# Patient Record
Sex: FEMALE | Race: WHITE | NOT HISPANIC OR LATINO | Employment: UNEMPLOYED | URBAN - METROPOLITAN AREA
[De-identification: names, ages, dates, MRNs, and addresses within clinical notes are randomized per-mention and may not be internally consistent; named-entity substitution may affect disease eponyms.]

---

## 2017-05-09 ENCOUNTER — HOSPITAL ENCOUNTER (OUTPATIENT)
Dept: LAB | Facility: HOSPITAL | Age: 22
Setting detail: SPECIMEN
Discharge: HOME | End: 2017-05-09
Attending: INTERNAL MEDICINE | Admitting: INTERNAL MEDICINE

## 2017-05-09 LAB
T4 FREE SERPL-MCNC: 1.14 NG/DL (ref 0.58–1.64)
TSH SERPL-ACNC: 1.18 UIU/ML (ref 0.34–5.6)

## 2017-05-26 ENCOUNTER — HOSPITAL ENCOUNTER (OUTPATIENT)
Dept: FAMILY MEDICINE CLINIC | Facility: CLINIC | Age: 22
Setting detail: SPECIMEN
Discharge: HOME | End: 2017-05-26
Attending: INTERNAL MEDICINE | Admitting: INTERNAL MEDICINE

## 2017-05-26 LAB
C TRACH RRNA SPEC QL PROBE: NORMAL
N GONORRHOEA RRNA SPEC QL PROBE: NORMAL
SPECIMEN SOURCE: NORMAL

## 2017-11-03 ENCOUNTER — HOSPITAL ENCOUNTER (OUTPATIENT)
Dept: FAMILY MEDICINE CLINIC | Facility: CLINIC | Age: 22
Setting detail: SPECIMEN
Discharge: HOME | End: 2017-11-03
Attending: INTERNAL MEDICINE | Admitting: INTERNAL MEDICINE

## 2017-11-03 LAB
C TRACH RRNA SPEC QL PROBE: NORMAL
N GONORRHOEA RRNA SPEC QL PROBE: NORMAL
SPECIMEN SOURCE: NORMAL

## 2017-12-19 ENCOUNTER — HOSPITAL ENCOUNTER (OUTPATIENT)
Dept: LAB | Facility: HOSPITAL | Age: 22
Setting detail: SPECIMEN
Discharge: HOME | End: 2017-12-19
Attending: NURSE PRACTITIONER | Admitting: NURSE PRACTITIONER

## 2018-07-25 ENCOUNTER — HOSPITAL ENCOUNTER (OUTPATIENT)
Dept: LAB | Facility: HOSPITAL | Age: 23
Discharge: HOME | End: 2018-07-25
Attending: FAMILY MEDICINE | Admitting: FAMILY MEDICINE

## 2018-10-15 ENCOUNTER — HOSPITAL ENCOUNTER (OUTPATIENT)
Dept: PHYSICAL THERAPY | Facility: HOSPITAL | Age: 23
Setting detail: RECURRING SERIES
Discharge: HOME | End: 2018-12-31
Attending: ORTHOPAEDIC SURGERY | Admitting: ORTHOPAEDIC SURGERY

## 2018-12-06 ENCOUNTER — HOSPITAL ENCOUNTER (OUTPATIENT)
Dept: LAB | Facility: HOSPITAL | Age: 23
Setting detail: SPECIMEN
Discharge: HOME | End: 2018-12-06
Attending: NURSE PRACTITIONER | Admitting: NURSE PRACTITIONER

## 2018-12-06 LAB
T4 FREE SERPL-MCNC: 1.04 NG/DL (ref 0.58–1.64)
TSH SERPL-ACNC: 0.4 UIU/ML (ref 0.34–5.6)

## 2019-01-04 ENCOUNTER — HOSPITAL ENCOUNTER (OUTPATIENT)
Dept: PHYSICAL THERAPY | Facility: HOSPITAL | Age: 24
Setting detail: RECURRING SERIES
Discharge: HOME | End: 2019-02-06
Attending: ORTHOPAEDIC SURGERY | Admitting: ORTHOPAEDIC SURGERY

## 2019-07-23 ENCOUNTER — TELEPHONE (OUTPATIENT)
Dept: ENDOCRINOLOGY | Facility: CLINIC | Age: 24
End: 2019-07-23

## 2019-07-23 RX ORDER — LEVOTHYROXINE SODIUM 200 MCG
200 TABLET ORAL EVERY 24 HOURS
Qty: 90 TABLET | Refills: 3 | Status: SHIPPED | OUTPATIENT
Start: 2019-07-23 | End: 2020-03-20

## 2019-07-23 RX ORDER — LEVOTHYROXINE SODIUM 0.2 MG/1
200 TABLET ORAL EVERY 24 HOURS
COMMUNITY
Start: 2018-05-07 | End: 2019-07-23 | Stop reason: SDUPTHER

## 2019-11-22 DIAGNOSIS — E03.9 HYPOTHYROIDISM, UNSPECIFIED TYPE: ICD-10-CM

## 2019-11-22 DIAGNOSIS — E04.9 GOITER: ICD-10-CM

## 2019-11-22 DIAGNOSIS — E05.00 THYROTOXICOSIS WITH DIFFUSE GOITER AND WITHOUT THYROID STORM: ICD-10-CM

## 2019-11-22 DIAGNOSIS — E89.0 HYPOTHYROIDISM, POSTSURGICAL: Primary | ICD-10-CM

## 2019-11-22 PROBLEM — R30.0 DIFFICULT OR PAINFUL URINATION: Status: ACTIVE | Noted: 2017-04-13

## 2019-11-22 PROBLEM — N89.8 VAGINAL DISCHARGE: Status: ACTIVE | Noted: 2017-11-03

## 2019-11-22 PROBLEM — N39.0 URINARY TRACT INFECTION: Status: ACTIVE | Noted: 2017-04-13

## 2019-11-22 PROBLEM — M25.561 KNEE PAIN, RIGHT: Status: ACTIVE | Noted: 2018-12-06

## 2019-11-22 PROBLEM — IMO0001 TRANSITION OF PATIENT BETWEEN CARE SETTINGS: Status: ACTIVE | Noted: 2018-09-05

## 2019-11-22 PROBLEM — Z78.9 TRANSITION OF PATIENT BETWEEN CARE SETTINGS: Status: ACTIVE | Noted: 2018-09-05

## 2019-11-22 PROBLEM — L50.9 URTICARIA: Status: ACTIVE | Noted: 2018-05-19

## 2019-11-22 PROBLEM — S82.842S: Status: ACTIVE | Noted: 2018-09-05

## 2019-11-22 PROBLEM — L25.9 CONTACT DERMATITIS: Status: ACTIVE | Noted: 2018-05-18

## 2019-12-05 RX ORDER — DROSPIRENONE AND ETHINYL ESTRADIOL 0.02-3(28)
KIT ORAL
COMMUNITY
Start: 2019-05-01 | End: 2020-03-20

## 2019-12-05 RX ORDER — MELOXICAM 15 MG/1
TABLET ORAL EVERY 24 HOURS
COMMUNITY
Start: 2018-08-23 | End: 2020-03-20

## 2019-12-05 RX ORDER — ESCITALOPRAM OXALATE 5 MG/1
TABLET ORAL EVERY 24 HOURS
COMMUNITY
Start: 2018-12-06 | End: 2020-03-20

## 2019-12-12 ENCOUNTER — TELEPHONE (OUTPATIENT)
Dept: ENDOCRINOLOGY | Facility: CLINIC | Age: 24
End: 2019-12-12

## 2020-03-17 ENCOUNTER — LAB (OUTPATIENT)
Dept: LAB | Facility: HOSPITAL | Age: 25
End: 2020-03-17

## 2020-03-17 DIAGNOSIS — E03.9 HYPOTHYROIDISM, UNSPECIFIED TYPE: ICD-10-CM

## 2020-03-17 DIAGNOSIS — E05.00 THYROTOXICOSIS WITH DIFFUSE GOITER AND WITHOUT THYROID STORM: ICD-10-CM

## 2020-03-17 DIAGNOSIS — E04.9 GOITER: ICD-10-CM

## 2020-03-17 DIAGNOSIS — E89.0 HYPOTHYROIDISM, POSTSURGICAL: ICD-10-CM

## 2020-03-17 LAB
T4 FREE SERPL-MCNC: 2.07 NG/DL (ref 0.93–1.7)
TSH SERPL DL<=0.05 MIU/L-ACNC: 0.48 UIU/ML (ref 0.27–4.2)

## 2020-03-17 PROCEDURE — 36415 COLL VENOUS BLD VENIPUNCTURE: CPT

## 2020-03-17 PROCEDURE — 84443 ASSAY THYROID STIM HORMONE: CPT

## 2020-03-17 PROCEDURE — 84439 ASSAY OF FREE THYROXINE: CPT

## 2020-03-20 ENCOUNTER — OFFICE VISIT (OUTPATIENT)
Dept: ENDOCRINOLOGY | Facility: CLINIC | Age: 25
End: 2020-03-20

## 2020-03-20 VITALS
HEART RATE: 84 BPM | DIASTOLIC BLOOD PRESSURE: 70 MMHG | SYSTOLIC BLOOD PRESSURE: 110 MMHG | OXYGEN SATURATION: 94 % | BODY MASS INDEX: 28.99 KG/M2 | HEIGHT: 65 IN | WEIGHT: 174 LBS

## 2020-03-20 DIAGNOSIS — E89.0 HYPOTHYROIDISM, POSTSURGICAL: Primary | ICD-10-CM

## 2020-03-20 PROBLEM — S82.251D: Status: ACTIVE | Noted: 2020-03-20

## 2020-03-20 PROBLEM — S82.391D: Status: ACTIVE | Noted: 2020-03-20

## 2020-03-20 PROCEDURE — 99213 OFFICE O/P EST LOW 20 MIN: CPT | Performed by: INTERNAL MEDICINE

## 2020-03-20 RX ORDER — HYDROCODONE BITARTRATE AND ACETAMINOPHEN 5; 325 MG/1; MG/1
1-2 TABLET ORAL
COMMUNITY
Start: 2013-03-12 | End: 2020-03-20

## 2020-03-20 RX ORDER — ETONOGESTREL AND ETHINYL ESTRADIOL 11.7; 2.7 MG/1; MG/1
1 INSERT, EXTENDED RELEASE VAGINAL
COMMUNITY
End: 2020-03-20

## 2020-03-20 RX ORDER — LEVOTHYROXINE SODIUM 175 MCG
175 TABLET ORAL DAILY
Qty: 90 TABLET | Refills: 3 | Status: SHIPPED | OUTPATIENT
Start: 2020-03-20 | End: 2020-09-17

## 2020-03-20 RX ORDER — IBUPROFEN 200 MG
200 TABLET ORAL
COMMUNITY
End: 2020-03-20

## 2020-03-20 RX ORDER — CEPHALEXIN 500 MG/1
CAPSULE ORAL
COMMUNITY
Start: 2020-02-08 | End: 2020-03-20

## 2020-03-20 RX ORDER — VALACYCLOVIR HYDROCHLORIDE 500 MG/1
TABLET, FILM COATED ORAL
COMMUNITY
Start: 2020-03-18 | End: 2020-03-20

## 2020-03-20 RX ORDER — FLUCONAZOLE 150 MG/1
TABLET ORAL
COMMUNITY
Start: 2020-02-08 | End: 2020-03-20

## 2020-03-20 RX ORDER — VALACYCLOVIR HYDROCHLORIDE 1 G/1
TABLET, FILM COATED ORAL
COMMUNITY
Start: 2020-03-13 | End: 2021-03-11

## 2020-08-03 ENCOUNTER — TELEMEDICINE (OUTPATIENT)
Dept: FAMILY MEDICINE CLINIC | Facility: CLINIC | Age: 25
End: 2020-08-03

## 2020-08-03 DIAGNOSIS — F41.9 ANXIETY: Primary | ICD-10-CM

## 2020-08-03 PROCEDURE — 99213 OFFICE O/P EST LOW 20 MIN: CPT | Performed by: INTERNAL MEDICINE

## 2020-08-03 RX ORDER — ALPRAZOLAM 0.5 MG/1
0.5 TABLET ORAL 2 TIMES DAILY PRN
Qty: 45 TABLET | Refills: 1 | Status: SHIPPED | OUTPATIENT
Start: 2020-08-03

## 2020-08-03 RX ORDER — PAROXETINE HYDROCHLORIDE 20 MG/1
TABLET, FILM COATED ORAL
Qty: 30 TABLET | Refills: 2 | Status: SHIPPED | OUTPATIENT
Start: 2020-08-03 | End: 2020-11-25

## 2020-08-04 ENCOUNTER — LAB (OUTPATIENT)
Dept: LAB | Facility: HOSPITAL | Age: 25
End: 2020-08-04

## 2020-08-04 DIAGNOSIS — E89.0 HYPOTHYROIDISM, POSTSURGICAL: ICD-10-CM

## 2020-08-04 LAB
T4 FREE SERPL-MCNC: 1.47 NG/DL (ref 0.93–1.7)
TSH SERPL DL<=0.05 MIU/L-ACNC: 7.45 UIU/ML (ref 0.27–4.2)

## 2020-08-04 PROCEDURE — 84439 ASSAY OF FREE THYROXINE: CPT

## 2020-08-04 PROCEDURE — 84443 ASSAY THYROID STIM HORMONE: CPT

## 2020-08-04 PROCEDURE — 36415 COLL VENOUS BLD VENIPUNCTURE: CPT

## 2020-08-05 DIAGNOSIS — E03.9 HYPOTHYROIDISM, UNSPECIFIED TYPE: Primary | ICD-10-CM

## 2020-09-15 ENCOUNTER — LAB (OUTPATIENT)
Dept: LAB | Facility: HOSPITAL | Age: 25
End: 2020-09-15

## 2020-09-15 DIAGNOSIS — E03.9 HYPOTHYROIDISM, UNSPECIFIED TYPE: ICD-10-CM

## 2020-09-15 LAB
T4 FREE SERPL-MCNC: 1.33 NG/DL (ref 0.93–1.7)
TSH SERPL DL<=0.05 MIU/L-ACNC: 3.65 UIU/ML (ref 0.27–4.2)

## 2020-09-15 PROCEDURE — 84439 ASSAY OF FREE THYROXINE: CPT

## 2020-09-15 PROCEDURE — 84443 ASSAY THYROID STIM HORMONE: CPT

## 2020-09-15 PROCEDURE — 36415 COLL VENOUS BLD VENIPUNCTURE: CPT

## 2020-09-17 DIAGNOSIS — E04.9 GOITER: ICD-10-CM

## 2020-09-17 DIAGNOSIS — E03.9 HYPOTHYROIDISM, UNSPECIFIED TYPE: Primary | ICD-10-CM

## 2020-09-17 DIAGNOSIS — E05.90 HYPERTHYROIDISM: ICD-10-CM

## 2020-09-17 DIAGNOSIS — E05.00 THYROTOXICOSIS WITH DIFFUSE GOITER AND WITHOUT THYROID STORM: ICD-10-CM

## 2020-09-17 DIAGNOSIS — E05.00 GRAVES DISEASE: ICD-10-CM

## 2020-09-17 DIAGNOSIS — E89.0 HYPOTHYROIDISM, POSTSURGICAL: ICD-10-CM

## 2020-09-17 RX ORDER — LEVOTHYROXINE SODIUM 200 MCG
200 TABLET ORAL DAILY
Qty: 90 TABLET | Refills: 3 | Status: SHIPPED | OUTPATIENT
Start: 2020-09-17 | End: 2021-03-11

## 2020-11-12 ENCOUNTER — TELEPHONE (OUTPATIENT)
Dept: FAMILY MEDICINE CLINIC | Facility: CLINIC | Age: 25
End: 2020-11-12

## 2020-11-25 RX ORDER — PAROXETINE HYDROCHLORIDE 20 MG/1
TABLET, FILM COATED ORAL
Qty: 30 TABLET | Refills: 1 | Status: SHIPPED | OUTPATIENT
Start: 2020-11-25 | End: 2021-03-11

## 2020-12-14 ENCOUNTER — TELEPHONE (OUTPATIENT)
Dept: ENDOCRINOLOGY | Facility: CLINIC | Age: 25
End: 2020-12-14

## 2021-03-04 ENCOUNTER — LAB (OUTPATIENT)
Dept: LAB | Facility: HOSPITAL | Age: 26
End: 2021-03-04

## 2021-03-04 DIAGNOSIS — E03.9 HYPOTHYROIDISM, UNSPECIFIED TYPE: ICD-10-CM

## 2021-03-04 DIAGNOSIS — E05.00 GRAVES DISEASE: ICD-10-CM

## 2021-03-04 DIAGNOSIS — E89.0 HYPOTHYROIDISM, POSTSURGICAL: ICD-10-CM

## 2021-03-04 DIAGNOSIS — E05.90 HYPERTHYROIDISM: ICD-10-CM

## 2021-03-04 DIAGNOSIS — E05.00 THYROTOXICOSIS WITH DIFFUSE GOITER AND WITHOUT THYROID STORM: ICD-10-CM

## 2021-03-04 DIAGNOSIS — E04.9 GOITER: ICD-10-CM

## 2021-03-04 LAB
T4 FREE SERPL-MCNC: 2.47 NG/DL (ref 0.93–1.7)
TSH SERPL DL<=0.05 MIU/L-ACNC: 0.05 UIU/ML (ref 0.27–4.2)

## 2021-03-04 PROCEDURE — 84443 ASSAY THYROID STIM HORMONE: CPT

## 2021-03-04 PROCEDURE — 36415 COLL VENOUS BLD VENIPUNCTURE: CPT

## 2021-03-04 PROCEDURE — 84439 ASSAY OF FREE THYROXINE: CPT

## 2021-03-11 ENCOUNTER — OFFICE VISIT (OUTPATIENT)
Dept: ENDOCRINOLOGY | Facility: CLINIC | Age: 26
End: 2021-03-11

## 2021-03-11 VITALS
BODY MASS INDEX: 34.32 KG/M2 | HEIGHT: 65 IN | DIASTOLIC BLOOD PRESSURE: 70 MMHG | HEART RATE: 68 BPM | TEMPERATURE: 97.6 F | WEIGHT: 206 LBS | OXYGEN SATURATION: 95 % | SYSTOLIC BLOOD PRESSURE: 105 MMHG

## 2021-03-11 DIAGNOSIS — E89.0 HYPOTHYROIDISM, POSTSURGICAL: Primary | ICD-10-CM

## 2021-03-11 PROCEDURE — 99214 OFFICE O/P EST MOD 30 MIN: CPT | Performed by: INTERNAL MEDICINE

## 2021-03-11 RX ORDER — DEXTROMETHORPHAN HYDROBROMIDE AND PROMETHAZINE HYDROCHLORIDE 15; 6.25 MG/5ML; MG/5ML
SYRUP ORAL
COMMUNITY
Start: 2021-02-28 | End: 2021-03-29

## 2021-03-11 RX ORDER — AZITHROMYCIN 250 MG/1
TABLET, FILM COATED ORAL
COMMUNITY
Start: 2021-02-28 | End: 2021-03-11

## 2021-03-11 RX ORDER — BENZONATATE 200 MG/1
CAPSULE ORAL
COMMUNITY
Start: 2021-03-07 | End: 2021-03-29

## 2021-03-11 RX ORDER — ALBUTEROL SULFATE 90 UG/1
AEROSOL, METERED RESPIRATORY (INHALATION)
COMMUNITY
Start: 2021-02-28 | End: 2021-03-29

## 2021-03-11 RX ORDER — PREDNISONE 10 MG/1
TABLET ORAL
COMMUNITY
Start: 2021-03-07 | End: 2021-03-29

## 2021-03-11 RX ORDER — COVID-19 MOLECULAR TEST ASSAY
KIT MISCELLANEOUS
COMMUNITY
Start: 2021-02-23 | End: 2021-03-29

## 2021-03-11 RX ORDER — LEVOTHYROXINE SODIUM 175 MCG
175 TABLET ORAL DAILY
Qty: 30 TABLET | Refills: 11 | Status: SHIPPED | OUTPATIENT
Start: 2021-03-11

## 2021-03-12 ENCOUNTER — TELEPHONE (OUTPATIENT)
Dept: FAMILY MEDICINE CLINIC | Facility: CLINIC | Age: 26
End: 2021-03-12

## 2021-03-29 ENCOUNTER — LAB (OUTPATIENT)
Dept: FAMILY MEDICINE CLINIC | Facility: CLINIC | Age: 26
End: 2021-03-29

## 2021-03-29 ENCOUNTER — OFFICE VISIT (OUTPATIENT)
Dept: FAMILY MEDICINE CLINIC | Facility: CLINIC | Age: 26
End: 2021-03-29

## 2021-03-29 VITALS
BODY MASS INDEX: 34.66 KG/M2 | DIASTOLIC BLOOD PRESSURE: 72 MMHG | SYSTOLIC BLOOD PRESSURE: 120 MMHG | WEIGHT: 208 LBS | HEART RATE: 60 BPM | RESPIRATION RATE: 16 BRPM | OXYGEN SATURATION: 98 % | TEMPERATURE: 96.9 F | HEIGHT: 65 IN

## 2021-03-29 DIAGNOSIS — Z00.00 PREVENTATIVE HEALTH CARE: Primary | ICD-10-CM

## 2021-03-29 DIAGNOSIS — K59.00 CONSTIPATION, UNSPECIFIED CONSTIPATION TYPE: ICD-10-CM

## 2021-03-29 DIAGNOSIS — E03.9 HYPOTHYROIDISM, UNSPECIFIED TYPE: ICD-10-CM

## 2021-03-29 DIAGNOSIS — F32.1 CURRENT MODERATE EPISODE OF MAJOR DEPRESSIVE DISORDER, UNSPECIFIED WHETHER RECURRENT (HCC): ICD-10-CM

## 2021-03-29 LAB
ALBUMIN SERPL-MCNC: 4.4 G/DL (ref 3.5–5.2)
ALBUMIN/GLOB SERPL: 1.7 G/DL
ALP SERPL-CCNC: 61 U/L (ref 39–117)
ALT SERPL W P-5'-P-CCNC: 21 U/L (ref 1–33)
ANION GAP SERPL CALCULATED.3IONS-SCNC: 7.2 MMOL/L (ref 5–15)
AST SERPL-CCNC: 20 U/L (ref 1–32)
BASOPHILS # BLD AUTO: 0.03 10*3/MM3 (ref 0–0.2)
BASOPHILS NFR BLD AUTO: 0.5 % (ref 0–1.5)
BILIRUB SERPL-MCNC: 0.3 MG/DL (ref 0–1.2)
BUN SERPL-MCNC: 10 MG/DL (ref 6–20)
BUN/CREAT SERPL: 15.6 (ref 7–25)
CALCIUM SPEC-SCNC: 9.3 MG/DL (ref 8.6–10.5)
CHLORIDE SERPL-SCNC: 104 MMOL/L (ref 98–107)
CO2 SERPL-SCNC: 25.8 MMOL/L (ref 22–29)
CREAT SERPL-MCNC: 0.64 MG/DL (ref 0.57–1)
DEPRECATED RDW RBC AUTO: 39.8 FL (ref 37–54)
EOSINOPHIL # BLD AUTO: 0.21 10*3/MM3 (ref 0–0.4)
EOSINOPHIL NFR BLD AUTO: 3.6 % (ref 0.3–6.2)
ERYTHROCYTE [DISTWIDTH] IN BLOOD BY AUTOMATED COUNT: 12.7 % (ref 12.3–15.4)
GFR SERPL CREATININE-BSD FRML MDRD: 112 ML/MIN/1.73
GLOBULIN UR ELPH-MCNC: 2.6 GM/DL
GLUCOSE SERPL-MCNC: 103 MG/DL (ref 65–99)
HCT VFR BLD AUTO: 44.2 % (ref 34–46.6)
HCV AB SER DONR QL: NORMAL
HGB BLD-MCNC: 15.1 G/DL (ref 12–15.9)
IMM GRANULOCYTES # BLD AUTO: 0.01 10*3/MM3 (ref 0–0.05)
IMM GRANULOCYTES NFR BLD AUTO: 0.2 % (ref 0–0.5)
LYMPHOCYTES # BLD AUTO: 1.75 10*3/MM3 (ref 0.7–3.1)
LYMPHOCYTES NFR BLD AUTO: 30 % (ref 19.6–45.3)
MCH RBC QN AUTO: 29.5 PG (ref 26.6–33)
MCHC RBC AUTO-ENTMCNC: 34.2 G/DL (ref 31.5–35.7)
MCV RBC AUTO: 86.5 FL (ref 79–97)
MONOCYTES # BLD AUTO: 0.52 10*3/MM3 (ref 0.1–0.9)
MONOCYTES NFR BLD AUTO: 8.9 % (ref 5–12)
NEUTROPHILS NFR BLD AUTO: 3.32 10*3/MM3 (ref 1.7–7)
NEUTROPHILS NFR BLD AUTO: 56.8 % (ref 42.7–76)
NRBC BLD AUTO-RTO: 0 /100 WBC (ref 0–0.2)
PLATELET # BLD AUTO: 268 10*3/MM3 (ref 140–450)
PMV BLD AUTO: 11.6 FL (ref 6–12)
POTASSIUM SERPL-SCNC: 4.3 MMOL/L (ref 3.5–5.2)
PROT SERPL-MCNC: 7 G/DL (ref 6–8.5)
RBC # BLD AUTO: 5.11 10*6/MM3 (ref 3.77–5.28)
SODIUM SERPL-SCNC: 137 MMOL/L (ref 136–145)
WBC # BLD AUTO: 5.84 10*3/MM3 (ref 3.4–10.8)

## 2021-03-29 PROCEDURE — 86803 HEPATITIS C AB TEST: CPT | Performed by: NURSE PRACTITIONER

## 2021-03-29 PROCEDURE — 85025 COMPLETE CBC W/AUTO DIFF WBC: CPT | Performed by: NURSE PRACTITIONER

## 2021-03-29 PROCEDURE — 99395 PREV VISIT EST AGE 18-39: CPT | Performed by: NURSE PRACTITIONER

## 2021-03-29 PROCEDURE — 80053 COMPREHEN METABOLIC PANEL: CPT | Performed by: NURSE PRACTITIONER

## 2021-04-27 ENCOUNTER — LAB (OUTPATIENT)
Dept: LAB | Facility: HOSPITAL | Age: 26
End: 2021-04-27

## 2021-04-27 DIAGNOSIS — E89.0 HYPOTHYROIDISM, POSTSURGICAL: ICD-10-CM

## 2021-04-27 LAB
T4 FREE SERPL-MCNC: 1.82 NG/DL (ref 0.93–1.7)
TSH SERPL DL<=0.05 MIU/L-ACNC: 0.27 UIU/ML (ref 0.27–4.2)

## 2021-04-27 PROCEDURE — 84439 ASSAY OF FREE THYROXINE: CPT

## 2021-04-27 PROCEDURE — 36415 COLL VENOUS BLD VENIPUNCTURE: CPT

## 2021-04-27 PROCEDURE — 84443 ASSAY THYROID STIM HORMONE: CPT

## 2021-08-06 ENCOUNTER — TRANSCRIBE ORDERS (OUTPATIENT)
Dept: ADMINISTRATIVE | Facility: HOSPITAL | Age: 26
End: 2021-08-06

## 2021-08-06 ENCOUNTER — LAB (OUTPATIENT)
Dept: LAB | Facility: HOSPITAL | Age: 26
End: 2021-08-06

## 2021-08-06 DIAGNOSIS — F32.1 MAJOR DEPRESSIVE DISORDER, SINGLE EPISODE, MODERATE (HCC): Primary | ICD-10-CM

## 2021-08-06 DIAGNOSIS — F41.1 GENERALIZED ANXIETY DISORDER: ICD-10-CM

## 2021-08-06 DIAGNOSIS — Z51.81 ENCOUNTER FOR THERAPEUTIC DRUG MONITORING: ICD-10-CM

## 2021-08-06 DIAGNOSIS — F32.1 MAJOR DEPRESSIVE DISORDER, SINGLE EPISODE, MODERATE (HCC): ICD-10-CM

## 2021-08-06 PROCEDURE — 82306 VITAMIN D 25 HYDROXY: CPT

## 2021-08-06 PROCEDURE — 85025 COMPLETE CBC W/AUTO DIFF WBC: CPT

## 2021-08-06 PROCEDURE — 84443 ASSAY THYROID STIM HORMONE: CPT

## 2021-08-06 PROCEDURE — 80061 LIPID PANEL: CPT

## 2021-08-06 PROCEDURE — 83036 HEMOGLOBIN GLYCOSYLATED A1C: CPT

## 2021-08-06 PROCEDURE — 80053 COMPREHEN METABOLIC PANEL: CPT

## 2021-08-06 PROCEDURE — 82746 ASSAY OF FOLIC ACID SERUM: CPT

## 2021-08-06 PROCEDURE — 82607 VITAMIN B-12: CPT

## 2021-08-06 PROCEDURE — 36415 COLL VENOUS BLD VENIPUNCTURE: CPT

## 2021-08-06 PROCEDURE — 84439 ASSAY OF FREE THYROXINE: CPT

## 2021-08-07 LAB
25(OH)D3 SERPL-MCNC: 32.5 NG/ML (ref 30–100)
ALBUMIN SERPL-MCNC: 4.5 G/DL (ref 3.5–5.2)
ALBUMIN/GLOB SERPL: 1.5 G/DL
ALP SERPL-CCNC: 77 U/L (ref 39–117)
ALT SERPL W P-5'-P-CCNC: 25 U/L (ref 1–33)
ANION GAP SERPL CALCULATED.3IONS-SCNC: 9.3 MMOL/L (ref 5–15)
AST SERPL-CCNC: 25 U/L (ref 1–32)
BASOPHILS # BLD AUTO: 0.05 10*3/MM3 (ref 0–0.2)
BASOPHILS NFR BLD AUTO: 0.5 % (ref 0–1.5)
BILIRUB SERPL-MCNC: 0.3 MG/DL (ref 0–1.2)
BUN SERPL-MCNC: 11 MG/DL (ref 6–20)
BUN/CREAT SERPL: 15.3 (ref 7–25)
CALCIUM SPEC-SCNC: 9.5 MG/DL (ref 8.6–10.5)
CHLORIDE SERPL-SCNC: 101 MMOL/L (ref 98–107)
CHOLEST SERPL-MCNC: 187 MG/DL (ref 0–200)
CO2 SERPL-SCNC: 27.7 MMOL/L (ref 22–29)
CREAT SERPL-MCNC: 0.72 MG/DL (ref 0.57–1)
DEPRECATED RDW RBC AUTO: 42.5 FL (ref 37–54)
EOSINOPHIL # BLD AUTO: 0.17 10*3/MM3 (ref 0–0.4)
EOSINOPHIL NFR BLD AUTO: 1.8 % (ref 0.3–6.2)
ERYTHROCYTE [DISTWIDTH] IN BLOOD BY AUTOMATED COUNT: 13.3 % (ref 12.3–15.4)
FOLATE SERPL-MCNC: 15.1 NG/ML (ref 4.78–24.2)
GFR SERPL CREATININE-BSD FRML MDRD: 98 ML/MIN/1.73
GLOBULIN UR ELPH-MCNC: 3.1 GM/DL
GLUCOSE SERPL-MCNC: 76 MG/DL (ref 65–99)
HCT VFR BLD AUTO: 46.9 % (ref 34–46.6)
HDLC SERPL-MCNC: 66 MG/DL (ref 40–60)
HGB BLD-MCNC: 15.3 G/DL (ref 12–15.9)
IMM GRANULOCYTES # BLD AUTO: 0.02 10*3/MM3 (ref 0–0.05)
IMM GRANULOCYTES NFR BLD AUTO: 0.2 % (ref 0–0.5)
LDLC SERPL CALC-MCNC: 94 MG/DL (ref 0–100)
LDLC/HDLC SERPL: 1.36 {RATIO}
LYMPHOCYTES # BLD AUTO: 1.99 10*3/MM3 (ref 0.7–3.1)
LYMPHOCYTES NFR BLD AUTO: 21.2 % (ref 19.6–45.3)
MCH RBC QN AUTO: 28.4 PG (ref 26.6–33)
MCHC RBC AUTO-ENTMCNC: 32.6 G/DL (ref 31.5–35.7)
MCV RBC AUTO: 87 FL (ref 79–97)
MONOCYTES # BLD AUTO: 0.59 10*3/MM3 (ref 0.1–0.9)
MONOCYTES NFR BLD AUTO: 6.3 % (ref 5–12)
NEUTROPHILS NFR BLD AUTO: 6.58 10*3/MM3 (ref 1.7–7)
NEUTROPHILS NFR BLD AUTO: 70 % (ref 42.7–76)
NRBC BLD AUTO-RTO: 0 /100 WBC (ref 0–0.2)
PLATELET # BLD AUTO: 343 10*3/MM3 (ref 140–450)
PMV BLD AUTO: 10.9 FL (ref 6–12)
POTASSIUM SERPL-SCNC: 4.4 MMOL/L (ref 3.5–5.2)
PROT SERPL-MCNC: 7.6 G/DL (ref 6–8.5)
RBC # BLD AUTO: 5.39 10*6/MM3 (ref 3.77–5.28)
SODIUM SERPL-SCNC: 138 MMOL/L (ref 136–145)
T4 FREE SERPL-MCNC: 1.71 NG/DL (ref 0.93–1.7)
TRIGL SERPL-MCNC: 155 MG/DL (ref 0–150)
TSH SERPL DL<=0.05 MIU/L-ACNC: 0.53 UIU/ML (ref 0.27–4.2)
VIT B12 BLD-MCNC: 838 PG/ML (ref 211–946)
VLDLC SERPL-MCNC: 27 MG/DL (ref 5–40)
WBC # BLD AUTO: 9.4 10*3/MM3 (ref 3.4–10.8)

## 2021-08-09 LAB — HBA1C MFR BLD: 4.9 % (ref 3.5–5.6)

## 2021-08-16 ENCOUNTER — TELEPHONE (OUTPATIENT)
Dept: FAMILY MEDICINE CLINIC | Facility: CLINIC | Age: 26
End: 2021-08-16

## 2023-08-14 ENCOUNTER — OFFICE VISIT (OUTPATIENT)
Dept: FAMILY MEDICINE CLINIC | Facility: CLINIC | Age: 28
End: 2023-08-14
Payer: COMMERCIAL

## 2023-08-14 VITALS
SYSTOLIC BLOOD PRESSURE: 122 MMHG | BODY MASS INDEX: 36.82 KG/M2 | WEIGHT: 221 LBS | OXYGEN SATURATION: 98 % | HEART RATE: 70 BPM | HEIGHT: 65 IN | DIASTOLIC BLOOD PRESSURE: 80 MMHG

## 2023-08-14 DIAGNOSIS — Z00.00 PREVENTATIVE HEALTH CARE: ICD-10-CM

## 2023-08-14 DIAGNOSIS — R41.840 DIFFICULTY CONCENTRATING: ICD-10-CM

## 2023-08-14 DIAGNOSIS — F32.A ANXIETY AND DEPRESSION: Primary | ICD-10-CM

## 2023-08-14 DIAGNOSIS — R63.5 WEIGHT GAIN: ICD-10-CM

## 2023-08-14 DIAGNOSIS — E03.9 ACQUIRED HYPOTHYROIDISM: ICD-10-CM

## 2023-08-14 DIAGNOSIS — F41.9 ANXIETY AND DEPRESSION: Primary | ICD-10-CM

## 2023-08-14 DIAGNOSIS — L70.9 ACNE, UNSPECIFIED ACNE TYPE: ICD-10-CM

## 2023-08-14 PROCEDURE — 99214 OFFICE O/P EST MOD 30 MIN: CPT | Performed by: NURSE PRACTITIONER

## 2023-08-14 RX ORDER — FLUOXETINE HYDROCHLORIDE 20 MG/1
20 CAPSULE ORAL DAILY
COMMUNITY
Start: 2022-10-09

## 2023-08-14 RX ORDER — LEVOTHYROXINE SODIUM 175 MCG
175 TABLET ORAL DAILY
Qty: 30 TABLET | Refills: 11 | Status: SHIPPED | OUTPATIENT
Start: 2023-08-14

## 2023-09-28 ENCOUNTER — CLINICAL SUPPORT (OUTPATIENT)
Dept: FAMILY MEDICINE CLINIC | Facility: CLINIC | Age: 28
End: 2023-09-28
Payer: COMMERCIAL

## 2023-09-28 DIAGNOSIS — Z00.00 PREVENTATIVE HEALTH CARE: ICD-10-CM

## 2023-09-28 DIAGNOSIS — L70.9 ACNE, UNSPECIFIED ACNE TYPE: ICD-10-CM

## 2023-09-28 DIAGNOSIS — R63.5 WEIGHT GAIN: ICD-10-CM

## 2023-09-28 DIAGNOSIS — E03.9 ACQUIRED HYPOTHYROIDISM: ICD-10-CM

## 2023-09-28 PROCEDURE — 84403 ASSAY OF TOTAL TESTOSTERONE: CPT | Performed by: NURSE PRACTITIONER

## 2023-09-28 PROCEDURE — 83036 HEMOGLOBIN GLYCOSYLATED A1C: CPT | Performed by: NURSE PRACTITIONER

## 2023-09-28 PROCEDURE — 84439 ASSAY OF FREE THYROXINE: CPT | Performed by: NURSE PRACTITIONER

## 2023-09-28 PROCEDURE — 36415 COLL VENOUS BLD VENIPUNCTURE: CPT | Performed by: NURSE PRACTITIONER

## 2023-09-28 PROCEDURE — 80061 LIPID PANEL: CPT | Performed by: NURSE PRACTITIONER

## 2023-09-28 PROCEDURE — 80053 COMPREHEN METABOLIC PANEL: CPT | Performed by: NURSE PRACTITIONER

## 2023-09-28 PROCEDURE — 85027 COMPLETE CBC AUTOMATED: CPT | Performed by: NURSE PRACTITIONER

## 2023-09-28 PROCEDURE — 84443 ASSAY THYROID STIM HORMONE: CPT | Performed by: NURSE PRACTITIONER

## 2023-09-28 PROCEDURE — 84481 FREE ASSAY (FT-3): CPT | Performed by: NURSE PRACTITIONER

## 2023-09-28 PROCEDURE — 84402 ASSAY OF FREE TESTOSTERONE: CPT | Performed by: NURSE PRACTITIONER

## 2023-09-29 LAB
ALBUMIN SERPL-MCNC: 4.6 G/DL (ref 3.5–5.2)
ALBUMIN/GLOB SERPL: 1.6 G/DL
ALP SERPL-CCNC: 58 U/L (ref 39–117)
ALT SERPL W P-5'-P-CCNC: 25 U/L (ref 1–33)
ANION GAP SERPL CALCULATED.3IONS-SCNC: 11.3 MMOL/L (ref 5–15)
AST SERPL-CCNC: 24 U/L (ref 1–32)
BILIRUB SERPL-MCNC: 0.6 MG/DL (ref 0–1.2)
BUN SERPL-MCNC: 15 MG/DL (ref 6–20)
BUN/CREAT SERPL: 21.7 (ref 7–25)
CALCIUM SPEC-SCNC: 9.4 MG/DL (ref 8.6–10.5)
CHLORIDE SERPL-SCNC: 101 MMOL/L (ref 98–107)
CHOLEST SERPL-MCNC: 203 MG/DL (ref 0–200)
CO2 SERPL-SCNC: 23.7 MMOL/L (ref 22–29)
CREAT SERPL-MCNC: 0.69 MG/DL (ref 0.57–1)
DEPRECATED RDW RBC AUTO: 38.7 FL (ref 37–54)
EGFRCR SERPLBLD CKD-EPI 2021: 121.4 ML/MIN/1.73
ERYTHROCYTE [DISTWIDTH] IN BLOOD BY AUTOMATED COUNT: 12 % (ref 12.3–15.4)
GLOBULIN UR ELPH-MCNC: 2.9 GM/DL
GLUCOSE SERPL-MCNC: 123 MG/DL (ref 65–99)
HBA1C MFR BLD: 5.4 % (ref 4.8–5.6)
HCT VFR BLD AUTO: 43.2 % (ref 34–46.6)
HDLC SERPL-MCNC: 54 MG/DL (ref 40–60)
HGB BLD-MCNC: 14.9 G/DL (ref 12–15.9)
LDLC SERPL CALC-MCNC: 139 MG/DL (ref 0–100)
LDLC/HDLC SERPL: 2.55 {RATIO}
MCH RBC QN AUTO: 30.3 PG (ref 26.6–33)
MCHC RBC AUTO-ENTMCNC: 34.5 G/DL (ref 31.5–35.7)
MCV RBC AUTO: 88 FL (ref 79–97)
PLATELET # BLD AUTO: 264 10*3/MM3 (ref 140–450)
PMV BLD AUTO: 11.2 FL (ref 6–12)
POTASSIUM SERPL-SCNC: 4.2 MMOL/L (ref 3.5–5.2)
PROT SERPL-MCNC: 7.5 G/DL (ref 6–8.5)
RBC # BLD AUTO: 4.91 10*6/MM3 (ref 3.77–5.28)
SODIUM SERPL-SCNC: 136 MMOL/L (ref 136–145)
T3FREE SERPL-MCNC: 3.33 PG/ML (ref 2–4.4)
T4 FREE SERPL-MCNC: 1.9 NG/DL (ref 0.93–1.7)
TRIGL SERPL-MCNC: 56 MG/DL (ref 0–150)
TSH SERPL DL<=0.05 MIU/L-ACNC: 1.42 UIU/ML (ref 0.27–4.2)
VLDLC SERPL-MCNC: 10 MG/DL (ref 5–40)
WBC NRBC COR # BLD: 5.34 10*3/MM3 (ref 3.4–10.8)

## 2023-10-07 LAB
TESTOST FREE SERPL-MCNC: 2.8 PG/ML (ref 0–4.2)
TESTOST SERPL-MCNC: 30.9 NG/DL (ref 10–55)

## 2024-05-13 RX ORDER — FLUOXETINE HYDROCHLORIDE 20 MG/1
20 CAPSULE ORAL DAILY
Qty: 30 CAPSULE | Refills: 2 | Status: SHIPPED | OUTPATIENT
Start: 2024-05-13

## 2024-05-20 ENCOUNTER — LAB (OUTPATIENT)
Dept: FAMILY MEDICINE CLINIC | Facility: CLINIC | Age: 29
End: 2024-05-20
Payer: COMMERCIAL

## 2024-05-20 ENCOUNTER — OFFICE VISIT (OUTPATIENT)
Dept: FAMILY MEDICINE CLINIC | Facility: CLINIC | Age: 29
End: 2024-05-20
Payer: COMMERCIAL

## 2024-05-20 VITALS
SYSTOLIC BLOOD PRESSURE: 128 MMHG | DIASTOLIC BLOOD PRESSURE: 70 MMHG | OXYGEN SATURATION: 97 % | WEIGHT: 223 LBS | HEIGHT: 65 IN | HEART RATE: 71 BPM | BODY MASS INDEX: 37.15 KG/M2

## 2024-05-20 DIAGNOSIS — E03.9 HYPOTHYROIDISM, UNSPECIFIED TYPE: Primary | ICD-10-CM

## 2024-05-20 DIAGNOSIS — E66.9 CLASS 2 OBESITY WITHOUT SERIOUS COMORBIDITY WITH BODY MASS INDEX (BMI) OF 37.0 TO 37.9 IN ADULT, UNSPECIFIED OBESITY TYPE: ICD-10-CM

## 2024-05-20 DIAGNOSIS — S82.251D DISPLACED COMMINUTED FRACTURE OF SHAFT OF RIGHT TIBIA, SUBSEQUENT ENCOUNTER FOR CLOSED FRACTURE WITH ROUTINE HEALING: ICD-10-CM

## 2024-05-20 DIAGNOSIS — Z00.00 PREVENTATIVE HEALTH CARE: ICD-10-CM

## 2024-05-20 PROBLEM — E66.812 CLASS 2 OBESITY WITHOUT SERIOUS COMORBIDITY WITH BODY MASS INDEX (BMI) OF 37.0 TO 37.9 IN ADULT: Status: ACTIVE | Noted: 2024-05-20

## 2024-05-20 PROCEDURE — 80053 COMPREHEN METABOLIC PANEL: CPT | Performed by: NURSE PRACTITIONER

## 2024-05-20 PROCEDURE — 85027 COMPLETE CBC AUTOMATED: CPT | Performed by: NURSE PRACTITIONER

## 2024-05-20 PROCEDURE — 84439 ASSAY OF FREE THYROXINE: CPT | Performed by: NURSE PRACTITIONER

## 2024-05-20 PROCEDURE — 99214 OFFICE O/P EST MOD 30 MIN: CPT | Performed by: NURSE PRACTITIONER

## 2024-05-20 PROCEDURE — 84443 ASSAY THYROID STIM HORMONE: CPT | Performed by: NURSE PRACTITIONER

## 2024-05-20 PROCEDURE — 36415 COLL VENOUS BLD VENIPUNCTURE: CPT | Performed by: NURSE PRACTITIONER

## 2024-05-20 PROCEDURE — 84481 FREE ASSAY (FT-3): CPT | Performed by: NURSE PRACTITIONER

## 2024-05-20 PROCEDURE — 83036 HEMOGLOBIN GLYCOSYLATED A1C: CPT | Performed by: NURSE PRACTITIONER

## 2024-05-21 LAB
ALBUMIN SERPL-MCNC: 4.4 G/DL (ref 3.5–5.2)
ALBUMIN/GLOB SERPL: 1.7 G/DL
ALP SERPL-CCNC: 70 U/L (ref 39–117)
ALT SERPL W P-5'-P-CCNC: 21 U/L (ref 1–33)
ANION GAP SERPL CALCULATED.3IONS-SCNC: 10.3 MMOL/L (ref 5–15)
AST SERPL-CCNC: 19 U/L (ref 1–32)
BILIRUB SERPL-MCNC: 0.5 MG/DL (ref 0–1.2)
BUN SERPL-MCNC: 11 MG/DL (ref 6–20)
BUN/CREAT SERPL: 12.9 (ref 7–25)
CALCIUM SPEC-SCNC: 9.3 MG/DL (ref 8.6–10.5)
CHLORIDE SERPL-SCNC: 104 MMOL/L (ref 98–107)
CO2 SERPL-SCNC: 24.7 MMOL/L (ref 22–29)
CREAT SERPL-MCNC: 0.85 MG/DL (ref 0.57–1)
DEPRECATED RDW RBC AUTO: 36.9 FL (ref 37–54)
EGFRCR SERPLBLD CKD-EPI 2021: 95.2 ML/MIN/1.73
ERYTHROCYTE [DISTWIDTH] IN BLOOD BY AUTOMATED COUNT: 12.1 % (ref 12.3–15.4)
GLOBULIN UR ELPH-MCNC: 2.6 GM/DL
GLUCOSE SERPL-MCNC: 85 MG/DL (ref 65–99)
HBA1C MFR BLD: 5.1 % (ref 4.8–5.6)
HCT VFR BLD AUTO: 43.2 % (ref 34–46.6)
HGB BLD-MCNC: 14.7 G/DL (ref 12–15.9)
MCH RBC QN AUTO: 28.9 PG (ref 26.6–33)
MCHC RBC AUTO-ENTMCNC: 34 G/DL (ref 31.5–35.7)
MCV RBC AUTO: 85 FL (ref 79–97)
PLATELET # BLD AUTO: 311 10*3/MM3 (ref 140–450)
PMV BLD AUTO: 11.2 FL (ref 6–12)
POTASSIUM SERPL-SCNC: 4.3 MMOL/L (ref 3.5–5.2)
PROT SERPL-MCNC: 7 G/DL (ref 6–8.5)
RBC # BLD AUTO: 5.08 10*6/MM3 (ref 3.77–5.28)
SODIUM SERPL-SCNC: 139 MMOL/L (ref 136–145)
T3FREE SERPL-MCNC: 3.08 PG/ML (ref 2–4.4)
T4 FREE SERPL-MCNC: 1.57 NG/DL (ref 0.93–1.7)
TSH SERPL DL<=0.05 MIU/L-ACNC: 0.33 UIU/ML (ref 0.27–4.2)
WBC NRBC COR # BLD AUTO: 7.25 10*3/MM3 (ref 3.4–10.8)

## 2024-05-23 ENCOUNTER — TELEPHONE (OUTPATIENT)
Dept: FAMILY MEDICINE CLINIC | Facility: CLINIC | Age: 29
End: 2024-05-23
Payer: COMMERCIAL

## 2024-05-23 DIAGNOSIS — S82.251D DISPLACED COMMINUTED FRACTURE OF SHAFT OF RIGHT TIBIA, SUBSEQUENT ENCOUNTER FOR CLOSED FRACTURE WITH ROUTINE HEALING: Primary | ICD-10-CM

## 2024-08-26 ENCOUNTER — OFFICE VISIT (OUTPATIENT)
Dept: FAMILY MEDICINE CLINIC | Facility: CLINIC | Age: 29
End: 2024-08-26
Payer: COMMERCIAL

## 2024-08-26 VITALS
HEIGHT: 65 IN | WEIGHT: 220 LBS | OXYGEN SATURATION: 99 % | HEART RATE: 77 BPM | DIASTOLIC BLOOD PRESSURE: 70 MMHG | BODY MASS INDEX: 36.65 KG/M2 | SYSTOLIC BLOOD PRESSURE: 124 MMHG

## 2024-08-26 DIAGNOSIS — E66.9 CLASS 2 OBESITY WITHOUT SERIOUS COMORBIDITY WITH BODY MASS INDEX (BMI) OF 36.0 TO 36.9 IN ADULT, UNSPECIFIED OBESITY TYPE: ICD-10-CM

## 2024-08-26 DIAGNOSIS — F32.A ANXIETY AND DEPRESSION: ICD-10-CM

## 2024-08-26 DIAGNOSIS — F41.9 ANXIETY AND DEPRESSION: ICD-10-CM

## 2024-08-26 DIAGNOSIS — E03.9 HYPOTHYROIDISM, UNSPECIFIED TYPE: Primary | ICD-10-CM

## 2024-08-26 PROCEDURE — 99214 OFFICE O/P EST MOD 30 MIN: CPT | Performed by: NURSE PRACTITIONER

## 2024-08-26 RX ORDER — FLUOXETINE 40 MG/1
40 CAPSULE ORAL DAILY
Qty: 90 CAPSULE | Refills: 1 | Status: SHIPPED | OUTPATIENT
Start: 2024-08-26

## 2024-09-16 RX ORDER — LEVOTHYROXINE SODIUM 175 MCG
175 TABLET ORAL DAILY
Qty: 30 TABLET | Refills: 11 | Status: SHIPPED | OUTPATIENT
Start: 2024-09-16

## 2024-11-27 ENCOUNTER — TELEPHONE (OUTPATIENT)
Dept: FAMILY MEDICINE CLINIC | Facility: CLINIC | Age: 29
End: 2024-11-27
Payer: COMMERCIAL

## 2024-12-21 ENCOUNTER — TELEMEDICINE (OUTPATIENT)
Dept: FAMILY MEDICINE CLINIC | Facility: TELEHEALTH | Age: 29
End: 2024-12-21
Payer: COMMERCIAL

## 2024-12-21 DIAGNOSIS — R39.89 SUSPECTED UTI: Primary | ICD-10-CM

## 2024-12-21 RX ORDER — NITROFURANTOIN 25; 75 MG/1; MG/1
100 CAPSULE ORAL 2 TIMES DAILY
Qty: 10 CAPSULE | Refills: 0 | Status: SHIPPED | OUTPATIENT
Start: 2024-12-21 | End: 2024-12-26

## 2024-12-21 RX ORDER — LIOTHYRONINE SODIUM 5 UG/1
TABLET ORAL
COMMUNITY
Start: 2024-10-17

## 2024-12-21 NOTE — PROGRESS NOTES
Subjective   Chief Complaint   Patient presents with    Urinary Tract Infection       Rianna Tanner is a 29 y.o. female.     History of Present Illness  Patient reports urinary frequency, urgency, bladder fullness and change in urine color that started about 1 week ago.  Home UTI test was positive for UTI.  Urinary Tract Infection   This is a new problem. Episode onset: 1 week. The problem has been unchanged. There has been no fever. There is no history of pyelonephritis. Associated symptoms include frequency, hesitancy and urgency. Pertinent negatives include no chills, discharge, flank pain, hematuria, nausea, possible pregnancy, sweats or vomiting. She has tried nothing for the symptoms.        No Known Allergies    Past Medical History:   Diagnosis Date    Anxiety     Depression     Hypothyroidism     Pneumonia        Past Surgical History:   Procedure Laterality Date    FRACTURE SURGERY Right     leg    THYROIDECTOMY         Social History     Socioeconomic History    Marital status: Single   Tobacco Use    Smoking status: Never     Passive exposure: Never    Smokeless tobacco: Never   Vaping Use    Vaping status: Never Used   Substance and Sexual Activity    Alcohol use: Yes     Comment: socially     Drug use: Never    Sexual activity: Defer       Family History   Problem Relation Age of Onset    Arthritis Mother     Lupus Mother     Rheum arthritis Mother     Heart disease Father          Current Outpatient Medications:     liothyronine (CYTOMEL) 5 MCG tablet, take 1 tablet by mouth once daily on an empty stomach with levothyroxine, Disp: , Rfl:     FLUoxetine (PROzac) 40 MG capsule, Take 1 capsule by mouth Daily., Disp: 90 capsule, Rfl: 1    nitrofurantoin, macrocrystal-monohydrate, (Macrobid) 100 MG capsule, Take 1 capsule by mouth 2 (Two) Times a Day for 5 days., Disp: 10 capsule, Rfl: 0    Synthroid 175 MCG tablet, Take 1 tablet by mouth Daily., Disp: 30 tablet, Rfl: 11      Review of Systems    Constitutional:  Negative for chills, diaphoresis, fatigue and fever.   Gastrointestinal:  Negative for abdominal pain, nausea and vomiting.   Genitourinary:  Positive for frequency, hesitancy, pelvic pressure and urgency. Negative for dysuria, flank pain, hematuria, vaginal bleeding, vaginal discharge and vaginal pain.   Musculoskeletal:  Negative for back pain.        There were no vitals filed for this visit.    Objective   Physical Exam  Constitutional:       General: She is not in acute distress.     Appearance: Normal appearance. She is not ill-appearing, toxic-appearing or diaphoretic.   HENT:      Head: Normocephalic.      Mouth/Throat:      Lips: Pink.      Mouth: Mucous membranes are moist.   Pulmonary:      Effort: Pulmonary effort is normal.   Abdominal:      Tenderness: There is no abdominal tenderness (per pt).   Neurological:      Mental Status: She is alert and oriented to person, place, and time.          Procedures     Assessment & Plan   Diagnoses and all orders for this visit:    1. Suspected UTI (Primary)  -     nitrofurantoin, macrocrystal-monohydrate, (Macrobid) 100 MG capsule; Take 1 capsule by mouth 2 (Two) Times a Day for 5 days.  Dispense: 10 capsule; Refill: 0      Wipe front to back, increase water to flush the kidneys and avoid bladder irritants such as caffeine and alcohol.    -Warning signs: severe abdominal/pelvic/back pain, fever >101, blood in urine - seek medical attention as soon as possible for a hands on/objective exam and possible labs.     If symptoms worsen or do not improve follow up with your PCP or visit your nearest Urgent Care Center or ER.      PLAN: Discussed dosing, side effects, recommended other symptomatic care.  Patient should follow up with primary care provider, Urgent Care or ER if symptoms worsen, fail to resolve or other symptoms need attention. Patient/family agree to the above.         AURELIANO Hall     Mode of Visit: Video  Location of patient:  -HOME-  Location of provider: +[facility]+  You have chosen to receive care through a telehealth visit.  The patient has signed the video visit consent form.  The visit included audio and video interaction. No technical issues occurred during this visit.    The use of a video visit has been reviewed with the patient and verbal informed consent has been obtained. Myself and Rianna Tanner participated in this visit. The patient is located at [address] [place] [address] Dr Lozoya [address] [place] [address] [place] [facility]. I am located in [place], [place]. Mychart and Zoom were utilized.        This visit was performed via Telehealth.  This patient has been instructed to follow-up with their primary care provider if their symptoms worsen or the treatment provided does not resolve their illness.

## 2025-01-02 ENCOUNTER — OFFICE VISIT (OUTPATIENT)
Dept: FAMILY MEDICINE CLINIC | Facility: CLINIC | Age: 30
End: 2025-01-02
Payer: COMMERCIAL

## 2025-01-02 VITALS
DIASTOLIC BLOOD PRESSURE: 70 MMHG | HEART RATE: 60 BPM | WEIGHT: 208 LBS | HEIGHT: 65 IN | OXYGEN SATURATION: 98 % | SYSTOLIC BLOOD PRESSURE: 118 MMHG | BODY MASS INDEX: 34.66 KG/M2

## 2025-01-02 DIAGNOSIS — R07.81 RIB PAIN ON LEFT SIDE: ICD-10-CM

## 2025-01-02 DIAGNOSIS — R39.89 SUSPECTED UTI: ICD-10-CM

## 2025-01-02 DIAGNOSIS — Z30.011 OCP (ORAL CONTRACEPTIVE PILLS) INITIATION: Primary | ICD-10-CM

## 2025-01-02 DIAGNOSIS — Z32.01 POSITIVE PREGNANCY TEST: ICD-10-CM

## 2025-01-02 LAB
B-HCG UR QL: POSITIVE
EXPIRATION DATE: ABNORMAL
INTERNAL NEGATIVE CONTROL: ABNORMAL
INTERNAL POSITIVE CONTROL: ABNORMAL
Lab: ABNORMAL

## 2025-01-02 PROCEDURE — 81025 URINE PREGNANCY TEST: CPT | Performed by: NURSE PRACTITIONER

## 2025-01-02 PROCEDURE — 99214 OFFICE O/P EST MOD 30 MIN: CPT | Performed by: NURSE PRACTITIONER

## 2025-01-02 PROCEDURE — 84703 CHORIONIC GONADOTROPIN ASSAY: CPT | Performed by: NURSE PRACTITIONER

## 2025-01-02 PROCEDURE — 36415 COLL VENOUS BLD VENIPUNCTURE: CPT | Performed by: NURSE PRACTITIONER

## 2025-01-02 RX ORDER — AMOXICILLIN 500 MG/1
500 CAPSULE ORAL 2 TIMES DAILY
Qty: 20 CAPSULE | Refills: 0 | Status: SHIPPED | OUTPATIENT
Start: 2025-01-02 | End: 2025-01-12

## 2025-01-02 RX ORDER — METHYLPREDNISOLONE 4 MG/1
TABLET ORAL
Qty: 21 TABLET | Refills: 0 | Status: SHIPPED | OUTPATIENT
Start: 2025-01-02

## 2025-01-02 RX ORDER — LEVOFLOXACIN 500 MG/1
500 TABLET, FILM COATED ORAL DAILY
Qty: 7 TABLET | Refills: 0 | Status: SHIPPED | OUTPATIENT
Start: 2025-01-02 | End: 2025-01-02

## 2025-01-02 NOTE — PROGRESS NOTES
"Chief Complaint  Rib Pain (Pain under left breast and into rib cage for a week )    Subjective        Rianna Tanner presents to [facility]  History of Present Illness    Patient presents with complaints of left-sided rib pain, underneath her left breast -ongoing for the last week. Pain is intermittent, unchanged since starting. She denies cough but does hurt with inspiration. Patient reports she also started having urinary frequency, dark and foul smelling urine. She took an at home test and it was positive for UTI, then did a teledoc visit on 12/21 - prescribed Macrobid and has not started.     Patient wanting to start OCP.  She will see GYN in February for Pap smear, has had her Mirena out for nearly 1 year.  Patient is sexually active.     Objective   Vital Signs:  /70 (BP Location: Left arm, Patient Position: Sitting, Cuff Size: Large Adult)   Pulse 60   Ht 165.1 cm (65\")   Wt 94.3 kg (208 lb)   SpO2 98%   BMI 34.61 kg/m²   Estimated body mass index is 34.61 kg/m² as calculated from the following:    Height as of this encounter: 165.1 cm (65\").    Weight as of this encounter: 94.3 kg (208 lb).        Physical Exam  Constitutional:       Appearance: Normal appearance.   HENT:      Head: Normocephalic.   Cardiovascular:      Rate and Rhythm: Normal rate and regular rhythm.   Pulmonary:      Effort: Pulmonary effort is normal.      Breath sounds: Normal breath sounds.   Chest:          Comments: Site of pain, nontender to palpation  Abdominal:      General: Abdomen is flat. Bowel sounds are normal.      Palpations: Abdomen is soft.   Musculoskeletal:         General: Normal range of motion.      Cervical back: Neck supple.      Right lower leg: No edema.      Left lower leg: No edema.   Skin:     General: Skin is warm and dry.   Neurological:      Mental Status: She is alert and oriented to person, place, and time.      Gait: Gait is intact.   Psychiatric:         " Attention and Perception: Attention normal.         Mood and Affect: Mood normal.         Speech: Speech normal.        Result Review :                Assessment and Plan   Diagnoses and all orders for this visit:    1. OCP (oral contraceptive pills) initiation (Primary)  -     POCT pregnancy, urine    2. Positive pregnancy test  -     hCG, Serum, Qualitative; Future    3. Suspected UTI    4. Rib pain on left side    Other orders  -     methylPREDNISolone (MEDROL) 4 MG dose pack; Take as directed on package instructions.  Dispense: 21 tablet; Refill: 0  -     Discontinue: levoFLOXacin (LEVAQUIN) 500 MG tablet; Take 1 tablet by mouth Daily.  Dispense: 7 tablet; Refill: 0  -     amoxicillin (AMOXIL) 500 MG capsule; Take 1 capsule by mouth 2 (Two) Times a Day for 10 days.  Dispense: 20 capsule; Refill: 0      ? Pleurisy  D/C Macrobid  Start Amoxicillin (changed from Levaquin with + pregnancy test).  Serum hCG also ordered and recommendations will be made regarding her medications once resulted  Medrol dose pack  Push water  Call if sx persist or worsen       I spent 30 minutes caring for Rianna on this date of service. This time includes time spent by me in the following activities:preparing for the visit, reviewing tests, obtaining and/or reviewing a separately obtained history, performing a medically appropriate examination and/or evaluation , counseling and educating the patient/family/caregiver, ordering medications, tests, or procedures, documenting information in the medical record, and care coordination  Follow Up   Return if symptoms worsen or fail to improve.  Patient was given instructions and counseling regarding her condition or for health maintenance advice. Please see specific information pulled into the AVS if appropriate.

## 2025-01-02 NOTE — PROGRESS NOTES
Venipuncture Blood Specimen Collection  Venipuncture performed in the right arm by Jody Jessica MA with good hemostasis. Patient tolerated the procedure well without complications.   01/02/25   Jody Jessica MA

## 2025-01-03 LAB — HCG SERPL QL: POSITIVE

## 2025-01-23 ENCOUNTER — TELEPHONE (OUTPATIENT)
Dept: FAMILY MEDICINE CLINIC | Facility: CLINIC | Age: 30
End: 2025-01-23
Payer: COMMERCIAL

## 2025-01-23 NOTE — TELEPHONE ENCOUNTER
Caller: Rianna Tanner    Relationship: Self    Best call back number:[phone]      Caller requesting test results: PATIENT    What test was performed: HCG BLOOD TEST    When was the test performed: 1/2/25    Where was the test performed: OFFICE    Additional notes: SHE WOULD LIKE ADDITIONAL INFORMATION REGARDING THIS TEST

## 2025-01-23 NOTE — TELEPHONE ENCOUNTER
Spoke with patient and she had questions on her recent labs. Questions were answered and pt advised to call if she needed anything further. She is following up with her OB/GYN today.

## 2025-02-03 ENCOUNTER — OFFICE VISIT (OUTPATIENT)
Dept: FAMILY MEDICINE CLINIC | Facility: CLINIC | Age: 30
End: 2025-02-03
Payer: COMMERCIAL

## 2025-02-03 VITALS
OXYGEN SATURATION: 97 % | HEART RATE: 65 BPM | BODY MASS INDEX: 33.66 KG/M2 | WEIGHT: 202 LBS | DIASTOLIC BLOOD PRESSURE: 70 MMHG | HEIGHT: 65 IN | SYSTOLIC BLOOD PRESSURE: 112 MMHG

## 2025-02-03 DIAGNOSIS — Z30.011 OCP (ORAL CONTRACEPTIVE PILLS) INITIATION: Primary | ICD-10-CM

## 2025-02-03 PROCEDURE — 99213 OFFICE O/P EST LOW 20 MIN: CPT | Performed by: NURSE PRACTITIONER

## 2025-02-03 RX ORDER — LEVOTHYROXINE SODIUM 200 UG/1
1 TABLET ORAL DAILY
COMMUNITY
Start: 2025-01-07

## 2025-02-03 RX ORDER — NORGESTIMATE AND ETHINYL ESTRADIOL 7DAYSX3 LO
1 KIT ORAL DAILY
Qty: 30 TABLET | Refills: 11 | Status: SHIPPED | OUTPATIENT
Start: 2025-02-03 | End: 2025-03-05

## 2025-02-03 NOTE — PROGRESS NOTES
"Chief Complaint  Contraception (Discuss starting on birth control. Had a recent miscarriage )    Subjective        Rianna Tanner presents to [facility]  History of Present Illness    Patient presents for initiation of OCP.  She was seen in early January with a positive pregnancy test so OCP was not started at that time.  Patient reports miscarriage since that time is wanting to restart birth control. She has had two blood tests through GYN to verify complete miscarriage.     Objective   Vital Signs:  /70 (BP Location: Left arm, Patient Position: Sitting, Cuff Size: Large Adult)   Pulse 65   Ht 165.1 cm (65\")   Wt 91.6 kg (202 lb)   SpO2 97%   BMI 33.61 kg/m²   Estimated body mass index is 33.61 kg/m² as calculated from the following:    Height as of this encounter: 165.1 cm (65\").    Weight as of this encounter: 91.6 kg (202 lb).          Physical Exam  Constitutional:       Appearance: Normal appearance.   HENT:      Head: Normocephalic.   Cardiovascular:      Rate and Rhythm: Normal rate and regular rhythm.   Pulmonary:      Effort: Pulmonary effort is normal.      Breath sounds: Normal breath sounds.   Abdominal:      General: Abdomen is flat. Bowel sounds are normal.      Palpations: Abdomen is soft.   Musculoskeletal:         General: Normal range of motion.      Cervical back: Neck supple.      Right lower leg: No edema.      Left lower leg: No edema.   Skin:     General: Skin is warm and dry.   Neurological:      Mental Status: She is alert and oriented to person, place, and time.      Gait: Gait is intact.   Psychiatric:         Attention and Perception: Attention normal.         Mood and Affect: Mood normal.         Speech: Speech normal.        Result Review :                Assessment and Plan   Diagnoses and all orders for this visit:    1. OCP (oral contraceptive pills) initiation (Primary)  -     Norgestimate-Eth Estradiol (Ortho Tri-Cyclen Lo) " 0.18/0.215/0.25 MG-25 MCG tablet; Take 1 tablet by mouth Daily for 30 days.  Dispense: 30 tablet; Refill: 11      Education given regarding options for contraception, including oral contraceptives.      I spent 20 minutes caring for Rianna on this date of service. This time includes time spent by me in the following activities:preparing for the visit, reviewing tests, obtaining and/or reviewing a separately obtained history, performing a medically appropriate examination and/or evaluation , counseling and educating the patient/family/caregiver, ordering medications, tests, or procedures, documenting information in the medical record, and care coordination  Follow Up   Return in about 6 months (around 8/3/2025) for Annual physical.  Patient was given instructions and counseling regarding her condition or for health maintenance advice. Please see specific information pulled into the AVS if appropriate.

## 2025-06-03 ENCOUNTER — OFFICE VISIT (OUTPATIENT)
Dept: ENDOCRINOLOGY | Facility: CLINIC | Age: 30
End: 2025-06-03
Payer: COMMERCIAL

## 2025-06-03 VITALS
WEIGHT: 214 LBS | OXYGEN SATURATION: 100 % | DIASTOLIC BLOOD PRESSURE: 70 MMHG | SYSTOLIC BLOOD PRESSURE: 120 MMHG | HEIGHT: 65 IN | HEART RATE: 77 BPM | BODY MASS INDEX: 35.65 KG/M2

## 2025-06-03 DIAGNOSIS — E89.0 POSTSURGICAL HYPOTHYROIDISM: Primary | ICD-10-CM

## 2025-06-03 DIAGNOSIS — Z34.92 SECOND TRIMESTER PREGNANCY: ICD-10-CM

## 2025-06-03 DIAGNOSIS — E66.9 OBESITY (BMI 30-39.9): ICD-10-CM

## 2025-06-03 PROCEDURE — 99204 OFFICE O/P NEW MOD 45 MIN: CPT | Performed by: INTERNAL MEDICINE

## 2025-06-03 RX ORDER — LEVOTHYROXINE SODIUM 175 MCG
TABLET ORAL
Qty: 116 TABLET | Refills: 1 | Status: SHIPPED | OUTPATIENT
Start: 2025-06-03

## 2025-06-03 NOTE — PROGRESS NOTES
-----------------------------------------------------------------  ENDOCRINE CLINIC NOTE  -----------------------------------------------------------------        PATIENT NAME: Rianna Tanner  PATIENT : 1995 AGE: 29 y.o.  MRN NUMBER: [id number]  PRIMARY CARE: Lisa Dejesus APRN    ==========================================================================    CHIEF COMPLAINT: Hypothyroidism during pregnancy  DATE OF SERVICE: 25    ==========================================================================    HPI / SUBJECTIVE    29 y.o. female  is seen in the clinic today for hypothyroidism during pregnancy.  History of miscarriage in 2025.  Patient is currently 14 weeks and 5 days pregnant with estimated date of delivery 2025.  Known to have goiter status post total thyroidectomy in  due to Graves' disease.  Currently taking levothyroxine therapy, brand-name Synthyroid, current dosing 175 mcg one pill daily.  Except for Saturday and  when she takes 2 pills.  At the time of the blood work on 2025 patient was taking 175 mcg daily.  No family history of thyroid disorder but patient mother have a history significant for lupus disorder.  Patient reports to have normal maria de jesus ultrasound.  Reports to be properly taking medication.  Hx of gluten and dairy insensitivity.    ==========================================================================                                                PAST MEDICAL HISTORY    Past Medical History:   Diagnosis Date    Anxiety     Depression     Hypothyroidism     Pneumonia        ==========================================================================    PAST SURGICAL HISTORY    Past Surgical History:   Procedure Laterality Date    FRACTURE SURGERY Right     leg    THYROIDECTOMY         ==========================================================================    FAMILY HISTORY    Family History   Problem Relation  Age of Onset    Arthritis Mother     Lupus Mother     Rheum arthritis Mother     Heart disease Father     Cancer Maternal Grandmother     Arthritis Paternal Grandmother        ==========================================================================    SOCIAL HISTORY    Social History     Socioeconomic History    Marital status: Single   Tobacco Use    Smoking status: Former     Types: Cigarettes     Passive exposure: Never    Smokeless tobacco: Never   Vaping Use    Vaping status: Never Used   Substance and Sexual Activity    Alcohol use: Yes     Comment: socially     Drug use: Never    Sexual activity: Yes     Partners: Male     Birth control/protection: None       ==========================================================================    MEDICATIONS      Current Outpatient Medications:     Synthroid 175 MCG tablet, 1 pill by mouth daily except for Saturday and Sunday when she takes 2 pills, Disp: 116 tablet, Rfl: 1    ==========================================================================    ALLERGIES    No Known Allergies    ==========================================================================    OBJECTIVE    Vitals:    06/03/25 1247   BP: 120/70   Pulse: 77   SpO2: 100%     Body mass index is 35.61 kg/m².     General: Alert, cooperative, no acute distress  Thyroid:  no enlargement/tenderness/palpable nodules  Lungs: Clear to auscultation bilaterally, respirations unlabored  Heart: Regular rate and rhythm, S1 and S2 normal, no murmur, rub or gallop  Abdomen: Soft, NT, ND and Bowel sounds Positive  Extremities:  Extremities normal, atraumatic, no cyanosis or edema    ==========================================================================    LAB EVALUATION    Lab Results   Component Value Date    GLUCOSE 85 05/20/2024    BUN 11 05/20/2024    CREATININE 0.85 05/20/2024    EGFRIFNONA 98 08/06/2021    BCR 12.9 05/20/2024    K 4.3 05/20/2024    CO2 24.7 05/20/2024    CALCIUM 9.3 05/20/2024    ALBUMIN  4.4 05/20/2024    AST 19 05/20/2024    ALT 21 05/20/2024    CHOL 203 (H) 09/28/2023    TRIG 56 09/28/2023    HDL 54 09/28/2023     (H) 09/28/2023     Lab Results   Component Value Date    HGBA1C 5.10 05/20/2024    HGBA1C 5.40 09/28/2023    HGBA1C 4.9 08/06/2021     Lab Results   Component Value Date    CREATININE 0.85 05/20/2024     Lab Results   Component Value Date    TSH 0.325 05/20/2024    FREET4 1.57 05/20/2024       ==========================================================================    ASSESSMENT AND PLAN    # Hypothyroidism, postsurgical  # Second trimester pregnancy  # Obesity with BMI 35.61    -Reviewed blood work from April 2025  - Patient have significantly high TSH level with normal free T4 but patient have adjusted dose of levothyroxine/Synthyroid after that  - Counseled patient about hypothyroidism management during pregnancy to which she was understanding  - Will get blood work to be collected today including TSH and free T4  - Patient current dosing is 175 mcg daily except for Saturday and Sunday when she takes 1 extra pill overall making total of 225 mcg daily  - If there is evidence of persistent hypothyroidism which is not controlled we will uptitrate the dose of levothyroxine to 250 mcg p.o. daily  - This was discussed with patient in detail to which she was understanding and agreed with care  - Given previous history of miscarriage we will be closely monitoring patient with repeat blood work in 6 weeks and in 3 months time with clinical follow-up in 3 months  - Additionally patient is only tolerant to brand-name Synthyroid    Thank you for courtesy of consultation.    Return to clinic: 3 months    Entire assessment and plan was discussed and counseled the patient in detail to which patient verbalized understanding and agreed with care.  Answered all queries and concerns.    Part of this note may be an electronic transcription/translation of spoken language to printed text using the  Dragon Dictation System.     Note: Portions of this note may have been copied from previous notes but documentation have been reviewed and edited as necessary to support clinical decision making for today's visit.    ==========================================================================    INFORMATION PROVIDED TO PATIENT    Patient Instructions   Please,    - Continue Synthyroid 175 mcg 1 pill by mouth daily except for Saturday and Sunday when you will take 2 pills.  Take it every day, on an empty stomach, 30 minutes before eating  Avoid taking it with iron, calcium, other medications (blocks absorption), wait at least 4 hrs after taking levothyroxine to take these medications  If you miss a pill, you can take 2 the next day and return to schedule from next day     - Get blood work done today, depending on to your blood work results we will plan for adjustment of therapy.    - Plan for repeat blood work in 6 weeks and in 3 months time.    - Follow-up in 3 months time.    Thank you for your visit today.    If you have any questions or concerns please feel free to reach out of the office.       ==========================================================================  Jose Lundberg MD  Department of Endocrine, Diabetes and Metabolism  [place], [place]  ==========================================================================

## 2025-06-03 NOTE — PATIENT INSTRUCTIONS
Please,    - Continue Synthyroid 175 mcg 1 pill by mouth daily except for Saturday and Sunday when you will take 2 pills.  Take it every day, on an empty stomach, 30 minutes before eating  Avoid taking it with iron, calcium, other medications (blocks absorption), wait at least 4 hrs after taking levothyroxine to take these medications  If you miss a pill, you can take 2 the next day and return to schedule from next day     - Get blood work done today, depending on to your blood work results we will plan for adjustment of therapy.    - Plan for repeat blood work in 6 weeks and in 3 months time.    - Follow-up in 3 months time.    Thank you for your visit today.    If you have any questions or concerns please feel free to reach out of the office.

## 2025-06-04 ENCOUNTER — RESULTS FOLLOW-UP (OUTPATIENT)
Dept: ENDOCRINOLOGY | Facility: CLINIC | Age: 30
End: 2025-06-04

## 2025-06-04 DIAGNOSIS — E89.0 POSTSURGICAL HYPOTHYROIDISM: ICD-10-CM

## 2025-06-04 LAB
T4 FREE SERPL-MCNC: 1.79 NG/DL (ref 0.82–1.77)
TSH SERPL DL<=0.005 MIU/L-ACNC: 0.13 UIU/ML (ref 0.45–4.5)

## 2025-06-05 ENCOUNTER — TELEPHONE (OUTPATIENT)
Dept: ENDOCRINOLOGY | Facility: CLINIC | Age: 30
End: 2025-06-05
Payer: COMMERCIAL

## 2025-06-05 NOTE — TELEPHONE ENCOUNTER
[place] APPROVED  Rianna Tanner (Key: [id number])  PA Case ID #: [id number]  Rx #: [id number]  Drug  Synthroid 175MCG tablets  [employer] Non-Medicare Electronic [place]     Outcome  Approved today by Kimberly 2017  Approved.  Effective Date: 5/6/2025  Authorization Expiration Date: 6/5/2026

## 2025-07-15 ENCOUNTER — LAB (OUTPATIENT)
Dept: ENDOCRINOLOGY | Facility: CLINIC | Age: 30
End: 2025-07-15
Payer: COMMERCIAL

## 2025-07-15 ENCOUNTER — TELEPHONE (OUTPATIENT)
Dept: ENDOCRINOLOGY | Facility: CLINIC | Age: 30
End: 2025-07-15
Payer: COMMERCIAL

## 2025-07-15 DIAGNOSIS — E89.0 POSTSURGICAL HYPOTHYROIDISM: ICD-10-CM

## 2025-07-16 LAB
T4 FREE SERPL-MCNC: 0.65 NG/DL (ref 0.82–1.77)
TSH SERPL DL<=0.005 MIU/L-ACNC: 11.9 UIU/ML (ref 0.45–4.5)

## 2025-07-21 DIAGNOSIS — Z34.92 SECOND TRIMESTER PREGNANCY: ICD-10-CM

## 2025-07-21 DIAGNOSIS — E89.0 POSTSURGICAL HYPOTHYROIDISM: Primary | ICD-10-CM

## 2025-07-21 RX ORDER — LEVOTHYROXINE SODIUM 200 MCG
TABLET ORAL
Qty: 36 TABLET | Refills: 5 | Status: SHIPPED | OUTPATIENT
Start: 2025-07-21

## 2025-07-21 RX ORDER — LEVOTHYROXINE SODIUM 200 UG/1
TABLET ORAL
Qty: 36 TABLET | Refills: 5 | Status: SHIPPED | OUTPATIENT
Start: 2025-07-21 | End: 2025-07-21 | Stop reason: SDUPTHER